# Patient Record
Sex: FEMALE | Race: WHITE | NOT HISPANIC OR LATINO | URBAN - NONMETROPOLITAN AREA
[De-identification: names, ages, dates, MRNs, and addresses within clinical notes are randomized per-mention and may not be internally consistent; named-entity substitution may affect disease eponyms.]

---
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
## 2018-01-27 VITALS
HEIGHT: 68 IN | SYSTOLIC BLOOD PRESSURE: 104 MMHG | HEART RATE: 80 BPM | WEIGHT: 131.2 LBS | DIASTOLIC BLOOD PRESSURE: 62 MMHG | BODY MASS INDEX: 19.88 KG/M2

## 2018-03-06 ENCOUNTER — DOCUMENTATION ONLY (OUTPATIENT)
Dept: FAMILY MEDICINE | Facility: OTHER | Age: 18
End: 2018-03-06

## 2018-03-15 ENCOUNTER — OFFICE VISIT (OUTPATIENT)
Dept: FAMILY MEDICINE | Facility: OTHER | Age: 18
End: 2018-03-15
Attending: NURSE PRACTITIONER
Payer: COMMERCIAL

## 2018-03-15 VITALS
SYSTOLIC BLOOD PRESSURE: 100 MMHG | HEART RATE: 80 BPM | TEMPERATURE: 97.8 F | DIASTOLIC BLOOD PRESSURE: 66 MMHG | WEIGHT: 123.8 LBS | BODY MASS INDEX: 19.1 KG/M2

## 2018-03-15 DIAGNOSIS — K86.2 PANCREATIC CYST: ICD-10-CM

## 2018-03-15 DIAGNOSIS — Z11.3 SCREEN FOR STD (SEXUALLY TRANSMITTED DISEASE): Primary | ICD-10-CM

## 2018-03-15 DIAGNOSIS — N83.202 CYST OF LEFT OVARY: ICD-10-CM

## 2018-03-15 DIAGNOSIS — Z30.011 ENCOUNTER FOR INITIAL PRESCRIPTION OF CONTRACEPTIVE PILLS: ICD-10-CM

## 2018-03-15 DIAGNOSIS — R10.2 PELVIC PAIN IN FEMALE: ICD-10-CM

## 2018-03-15 LAB
C TRACH DNA SPEC QL PROBE+SIG AMP: NOT DETECTED
HCG UR QL: NEGATIVE
N GONORRHOEA DNA SPEC QL PROBE+SIG AMP: NOT DETECTED
SPECIMEN SOURCE: NORMAL

## 2018-03-15 PROCEDURE — 99214 OFFICE O/P EST MOD 30 MIN: CPT | Performed by: NURSE PRACTITIONER

## 2018-03-15 PROCEDURE — 87591 N.GONORRHOEAE DNA AMP PROB: CPT | Performed by: NURSE PRACTITIONER

## 2018-03-15 PROCEDURE — 87491 CHLMYD TRACH DNA AMP PROBE: CPT | Performed by: NURSE PRACTITIONER

## 2018-03-15 PROCEDURE — 81025 URINE PREGNANCY TEST: CPT | Performed by: NURSE PRACTITIONER

## 2018-03-15 RX ORDER — AMOXICILLIN 875 MG
875 TABLET ORAL 2 TIMES DAILY
Qty: 20 TABLET | Refills: 0 | Status: CANCELLED | OUTPATIENT
Start: 2018-03-15

## 2018-03-15 RX ORDER — NORGESTIMATE AND ETHINYL ESTRADIOL 7DAYSX3 28
1 KIT ORAL DAILY
Qty: 84 TABLET | Refills: 3 | Status: SHIPPED | OUTPATIENT
Start: 2018-03-15

## 2018-03-15 ASSESSMENT — PAIN SCALES - GENERAL: PAINLEVEL: MODERATE PAIN (4)

## 2018-03-15 NOTE — NURSING NOTE
Patient presents today for pain in abdomen, mainly around her ovaries that has been going on x2 weeks. Patient has a history cysts on her pancreas and she thought, her ovaries too. Patient rates her pain today at 4/10. Patient periods have been irregular always, but they have been come earlier than before.      Char Jin LPN on 3/15/2018 at 8:37 AM

## 2018-03-15 NOTE — PROGRESS NOTES
HPI:    Catina Carney is a 17 year old female who presents to clinic today for abdominal pain. Has had pain in lower abdomen, around her ovaries for the past week. Pain is intermittent throughout the day. She has taken ibuprofen for pain with some relief. Pain worse with laying down. Nothing other than ibuprofen making pain better. She does have known ovarian cyst 2.5 x1.8 on left side. She is sexually active. No OCP or birth control, uses condoms. No hx of STD's.     Past Medical History:   Diagnosis Date     Cyst of left ovary 3/15/2018    2.5 x 1.8 cm     Pancreatic cyst 3/15/2018    due to have f/u MRI 2/2019       History reviewed. No pertinent surgical history.    History reviewed. No pertinent family history.    Social History     Social History     Marital status: Single     Spouse name: N/A     Number of children: N/A     Years of education: N/A     Occupational History     Not on file.     Social History Main Topics     Smoking status: Never Smoker     Smokeless tobacco: Never Used     Alcohol use Not on file     Drug use: Not on file     Sexual activity: Not on file     Other Topics Concern     Not on file     Social History Narrative       Current Outpatient Prescriptions   Medication Sig Dispense Refill     norgestim-eth estrad triphasic (TRINESSA, 28,) 0.18/0.215/0.25 MG-35 MCG per tablet Take 1 tablet by mouth daily 84 tablet 3       No Known Allergies    ROS:  Pertinent positives and negatives are noted in HPI.    EXAM:  General appearance: well appearing female, in no acute distress  Respiratory: clear to auscultation bilaterally  Cardiac: RRR with no murmurs  Abdomen: soft, pelvic/suprapubic tenderness, no masses or organomegally  Psychological: normal affect, alert and pleasant  Lab:   Results for orders placed or performed in visit on 03/15/18   HCG Qual, Urine - CSC and Range ([id number])   Result Value Ref Range    HCG Qual Urine Negative NEG^Negative     EXAM:    CT Abdomen and Pelvis With  Intravenous Contrast     CLINICAL HISTORY:    17 years old, female; Pain; Abdominal pain; Flank; Right lower quadrant  (rlq); Lower back pain     TECHNIQUE:    Axial computed tomography images of the abdomen and pelvis with intravenous  contrast.  All CT scans at this facility use one or more dose reduction  techniques, viz.: automated exposure control; ma/kV adjustment per patient size  (including targeted exams where dose is matched to indication; i.e. head); or  iterative reconstruction technique.    Coronal and sagittal reformatted images were created and reviewed.     CONTRAST:    100 mL of Omni 350 administered intravenously.     COMPARISON:    No relevant prior studies available.     FINDINGS:    Lower thorax:  Lung bases are clear.  Trace pericardial effusion.      ABDOMEN:    Liver:  3 mm hypodensity near the right hepatic dome, too small to accurately  characterize, no followup necessary.  The liver is otherwise unremarkable.    Gallbladder and bile ducts:  Gallbladder is normal.  No calcified stones.  No  ductal dilation.    Pancreas:  6 mm hypodensity in the pancreatic neck is noted.    Spleen:  Spleen is normal.    Adrenals:  Adrenal glands are normal.    Kidneys and ureters:  The kidneys are normal.  Partially visualized ureters  are unremarkable.    Stomach and bowel:  Mild bowel wall thickening in the proximal small bowel in  the left upper abdomen is nonspecific and could be related to peristalsis, mild  enteritis is not excluded.  No obstruction.  Large bowel is unremarkable.    Appendix:  Appendix is normal.      PELVIS:    Bladder:  Diffuse thickening of the mostly decompressed bladder, nonspecific.   Correlate clinically for possible urinary tract infection.    Reproductive:  2.5 x 1.8 cm left ovarian cyst, likely follicle.  Retroflexed  uterus.      ABDOMEN and PELVIS:    Intraperitoneal space:  No evidence of free intraperitoneal air.  Small  amount of free fluid layers in the pelvis, likely  physiologic.    Bones/joints:  No acute fracture.  No dislocation.    Soft tissues:  Unremarkable.    Vasculature:  Unremarkable.  No abdominal aortic aneurysm.    Lymph nodes:  Unremarkable.  No enlarged lymph nodes.     IMPRESSION:    1.  Diffuse thickening of the mostly decompressed bladder, nonspecific.  Correlate for possible urinary tract infection.    2.  Mild bowel wall thickening in the proximal small bowel in the left upper  abdomen is nonspecific and could be related to peristalsis, however, mild  enteritis is not excluded.  Correlate clinically.    3.  6 mm hypodensity in the pancreatic neck could represent a pancreatic  cyst, dilated pancreatic duct, versus neoplasm.  Pancreatic protocol imaging  should be considered for further evaluation.     THIS DOCUMENT HAS BEEN ELECTRONICALLY SIGNED BY RENETTA AGGARWAL MD      ASSESSMENT AND PLAN:    1. Screen for STD (sexually transmitted disease)    2. Pelvic pain in female    3. Cyst of left ovary    4. Pancreatic cyst    5. Encounter for initial prescription of contraceptive pills      She has never had STD screening, this was ordered today and will call with results. Denies sx today. Discussed safe sex practices in detail. She is not on birth control. Interested in this to regulate her periods. Also may help with ovarian cyst, acne. Urine pregnancy test is negative. Tri-Sprintec ordered.     She had an incidental finding of a pancreatic cyst while in ER in January 2018. I did review this result with her in the clinic today. Reviewed this with Dr Douglass, radiology and this should have a f/u MR in 1 year. If normal or no changes at that time then no f/u needed. Reviewed any s/s that would warrant f/u sooner.     I spent approximately 20 minutes with the patient (exclusive of separately billed services/procedures), with greater than 50% spent in counseling, prognosis, risks and benefits of management or follow-up.  Reviewed importance of compliance with chosen  treatment options and follow-up.  Risk factor reduction and patient education and coordinating care, establishing and/or reviewing the patient's medical record also completed during today's exam .        Char Laguerre..................3/15/2018 8:47 AM

## 2018-03-15 NOTE — PATIENT INSTRUCTIONS
Ovarian Cysts    Ovarian cysts are sacs filled with fluid or tissue that form on or inside the ovaries. The ovaries are two small organs located on each side of a woman s uterus (womb). They are part of the female reproductive system.  Ovarian cysts are common in women, especially during childbearing years. There are different types of cysts. Most are harmless (benign) and go away on their own. They often cause no symptoms. If symptoms do occur, they can include mild pain or pressure in the lower belly (abdomen).  Cysts that are large or break (rupture) may cause more severe pain and symptoms. In these cases, hospital care or treatment such as surgery may be needed. More extensive treatment may also be needed if a cyst causes an ovary to twist (called torsion) or if a cyst is suspected to be cancerous. Keep in mind that most cysts are not cancerous, however.  General care    To help relieve pain, your healthcare provider may recommend using over-the-counter pain medicine. If needed, stronger pain medicine may be prescribed.    Depending on the type of cyst you have, your healthcare provider may advise taking birth control pills. These help shrink cysts in certain cases. They may also help prevent new cysts from forming. Be sure to take these medicines as directed if they are prescribed.    Your healthcare provider may advise you to watch your symptoms over time to see if they go away or worsen. Regular ultrasound tests may also be advised. These can help check if a cyst goes away or grows in size.  Follow-up care  Follow up with your healthcare provider, or as advised.  When to seek medical advice  Call your healthcare provider right away if any of these occur:    Pain worsens or fails to get better with home treatment    Fever of 100.4 F (38 C) or higher (or other fever amount directed by your healthcare provider)    Nausea and vomiting    Weakness, dizziness, or fainting    Abnormal vaginal bleeding  Date Last  Reviewed: 6/11/2015 2000-2017 [employer]. [address] [place] [address], [place] [address]. All rights reserved. This information is not intended as a substitute for professional medical care. Always follow your healthcare professional's instructions.

## 2018-03-16 ASSESSMENT — PATIENT HEALTH QUESTIONNAIRE - PHQ9: SUM OF ALL RESPONSES TO PHQ QUESTIONS 1-9: 1

## 2025-06-06 NOTE — MR AVS SNAPSHOT
After Visit Summary   3/15/2018    Catina Carney    MRN: [id number]           Patient Information     Date Of Birth          2000        Visit Information        Provider Department      3/15/2018 8:30 AM Char Laguerre APRN CNP [facility]        Today's Diagnoses     Screen for STD (sexually transmitted disease)    -  1    Pelvic pain in female        Cyst of left ovary        Pancreatic cyst        Encounter for initial prescription of contraceptive pills          Care Instructions      Ovarian Cysts    Ovarian cysts are sacs filled with fluid or tissue that form on or inside the ovaries. The ovaries are two small organs located on each side of a woman s uterus (womb). They are part of the female reproductive system.  Ovarian cysts are common in women, especially during childbearing years. There are different types of cysts. Most are harmless (benign) and go away on their own. They often cause no symptoms. If symptoms do occur, they can include mild pain or pressure in the lower belly (abdomen).  Cysts that are large or break (rupture) may cause more severe pain and symptoms. In these cases, hospital care or treatment such as surgery may be needed. More extensive treatment may also be needed if a cyst causes an ovary to twist (called torsion) or if a cyst is suspected to be cancerous. Keep in mind that most cysts are not cancerous, however.  General care    To help relieve pain, your healthcare provider may recommend using over-the-counter pain medicine. If needed, stronger pain medicine may be prescribed.    Depending on the type of cyst you have, your healthcare provider may advise taking birth control pills. These help shrink cysts in certain cases. They may also help prevent new cysts from forming. Be sure to take these medicines as directed if they are prescribed.    Your healthcare provider may advise you to watch your symptoms over time to see if they go  "away or worsen. Regular ultrasound tests may also be advised. These can help check if a cyst goes away or grows in size.  Follow-up care  Follow up with your healthcare provider, or as advised.  When to seek medical advice  Call your healthcare provider right away if any of these occur:    Pain worsens or fails to get better with home treatment    Fever of 100.4 F (38 C) or higher (or other fever amount directed by your healthcare provider)    Nausea and vomiting    Weakness, dizziness, or fainting    Abnormal vaginal bleeding  Date Last Reviewed: 6/11/2015 2000-2017 [employer]. [address] [place] [address]. All rights reserved. This information is not intended as a substitute for professional medical care. Always follow your healthcare professional's instructions.                Follow-ups after your visit        Who to contact     If you have questions or need follow up information about today's clinic visit or your schedule please contact [facility] directly at [phone].  Normal or non-critical lab and imaging results will be communicated to you by [employer] Dx[employer], letter or phone within 4 business days after the clinic has received the results. If you do not hear from us within 7 days, please contact the clinic through [employer] or phone. If you have a critical or abnormal lab result, we will notify you by phone as soon as possible.  Submit refill requests through [employer] or call your pharmacy and they will forward the refill request to us. Please allow 3 business days for your refill to be completed.          Additional Information About Your Visit        [employer] Information     [employer] lets you send messages to your doctor, view your test results, renew your prescriptions, schedule appointments and more. To sign up, go to [email or web address] . Click on \"Log in\" on the left side of the screen, which will take you to the Welcome page. Then click on \"Sign up " [No Acute Distress] : no acute distress "Now\" on the right side of the page.     You will be asked to enter the access code listed below, as well as some personal information. Please follow the directions to create your username and password.     Your access code is: [email or web address]  Expires: 2018 10:01 AM     Your access code will  in 90 days. If you need help or a new code, please call your [facility] clinic or [phone].        Care EveryWhere ID     This is your Care EveryWhere ID. This could be used by other organizations to access your [facility] medical records  [id number]        Your Vitals Were     Pulse Temperature Last Period Breastfeeding? BMI (Body Mass Index)       80 97.8  F (36.6  C) (Temporal) 2018 (Approximate) No 19.1 kg/m2        Blood Pressure from Last 3 Encounters:   03/15/18 100/66   17 104/62    Weight from Last 3 Encounters:   03/15/18 123 lb 12.8 oz (56.2 kg) (42 %)*   17 131 lb 3.2 oz (59.5 kg) (58 %)*     * Growth percentiles are based on CDC 2-20 Years data.              We Performed the Following     GC/Chlamydia by PCR - [facility]     HCG Qual, Urine - CSC and Range ([id number])          Today's Medication Changes          These changes are accurate as of 3/15/18 10:02 AM.  If you have any questions, ask your nurse or doctor.               Start taking these medicines.        Dose/Directions    norgestim-eth estrad triphasic 0.18/0.215/0.25 MG-35 MCG per tablet   Commonly known as:  TRINESSA (28)   Used for:  Encounter for initial prescription of contraceptive pills, Cyst of left ovary, Pelvic pain in female   Started by:  Char Laguerre APRN CNP        Dose:  1 tablet   Take 1 tablet by mouth daily   Quantity:  84 tablet   Refills:  3            Where to get your medicines      These medications were sent to [id number] - [place], [place] - [address]  [address], [facility] [address]     Phone:  [phone]     norgestim-eth estrad triphasic 0.18/0.215/0.25 " [Normal Sclera/Conjunctiva] : normal sclera/conjunctiva MG-35 MCG per tablet                Primary Care Provider Office Phone # Fax #    [facility] [phone] [address]  [facility] [address]        Equal Access to Services     ETTA GUO : Hadii aad ku hadlianao Raynaali, wanielsda luqadaha, qaybta kaalmada tiffanie, celia brodyjoshua hills maria de jesus kayden fish. So [facility] [phone].    ATENCIÓN: Si habla español, tiene a hoang disposición servicios gratuitos de asistencia lingüística. Llame al [phone].    We comply with applicable federal civil rights laws and [place] laws. We do not discriminate on the basis of race, color, national origin, age, disability, sex, sexual orientation, or gender identity.            Thank you!     Thank you for choosing [facility] AND \[facility]\""  for your care. Our goal is always to provide you with excellent care. Hearing back from our patients is one way we can continue to improve our services. Please take a few minutes to complete the written survey that you may receive in the mail after your visit with us. Thank you!             Your Updated Medication List - Protect others around you: Learn how to safely use, store and throw away your medicines at www.disposemymeds.org.          This list is accurate as of 3/15/18 10:02 AM.  Always use your most recent med list.                   Brand Name Dispense Instructions for use Diagnosis    norgestim-eth estrad triphasic 0.18/0.215/0.25 MG-35 MCG per tablet    TRINESSA (28)    84 tablet    Take 1 tablet by mouth daily    Encounter for initial prescription of contraceptive pills, Cyst of left ovary, Pelvic pain in female          [Normal Outer Ear/Nose] : the outer ears and nose were normal in appearance [No Respiratory Distress] : no respiratory distress  [Normal] : affect was normal and insight and judgment were intact